# Patient Record
Sex: MALE | Race: WHITE | URBAN - METROPOLITAN AREA
[De-identification: names, ages, dates, MRNs, and addresses within clinical notes are randomized per-mention and may not be internally consistent; named-entity substitution may affect disease eponyms.]

---

## 2020-12-02 ENCOUNTER — TELEPHONE ENCOUNTER (OUTPATIENT)
Dept: URBAN - METROPOLITAN AREA CLINIC 92 | Facility: CLINIC | Age: 50
End: 2020-12-02

## 2020-12-02 RX ORDER — MESALAMINE 1.2 G/1
2 TABS ONCE A DAY TABLET, DELAYED RELEASE ORAL
Qty: 180 | Refills: 3
Start: 2019-10-16

## 2020-12-02 RX ORDER — MESALAMINE 1.2 G/1
2 TABLETS TABLET, DELAYED RELEASE ORAL ONCE A DAY
Qty: 60 TABLET | Refills: 1 | OUTPATIENT
Start: 2020-12-02 | End: 2021-01-31

## 2020-12-02 RX ORDER — MESALAMINE 1.2 G/1
2 TABS ONCE A DAY TABLET, DELAYED RELEASE ORAL
Qty: 180 | Refills: 3 | Status: ACTIVE | COMMUNITY
Start: 2019-10-16

## 2020-12-02 NOTE — HPI-TODAY'S VISIT:
Patient request refill.  Overdue for follow up visit and also due for colonoscopy for surveillance of advanced adenoma with villous component.  Was due for colonoscopy in JAN 2020.  Patient has follow up appt scheduled.

## 2020-12-28 ENCOUNTER — WEB ENCOUNTER (OUTPATIENT)
Dept: URBAN - METROPOLITAN AREA CLINIC 128 | Facility: CLINIC | Age: 50
End: 2020-12-28

## 2020-12-28 ENCOUNTER — OFFICE VISIT (OUTPATIENT)
Dept: URBAN - METROPOLITAN AREA CLINIC 128 | Facility: CLINIC | Age: 50
End: 2020-12-28
Payer: COMMERCIAL

## 2020-12-28 DIAGNOSIS — K63.5 COLON POLYPS: ICD-10-CM

## 2020-12-28 DIAGNOSIS — K51.90 ULCERATIVE COLITIS: ICD-10-CM

## 2020-12-28 PROCEDURE — G8482 FLU IMMUNIZE ORDER/ADMIN: HCPCS | Performed by: INTERNAL MEDICINE

## 2020-12-28 PROCEDURE — G8420 CALC BMI NORM PARAMETERS: HCPCS | Performed by: INTERNAL MEDICINE

## 2020-12-28 PROCEDURE — G8427 DOCREV CUR MEDS BY ELIG CLIN: HCPCS | Performed by: INTERNAL MEDICINE

## 2020-12-28 PROCEDURE — G9903 PT SCRN TBCO ID AS NON USER: HCPCS | Performed by: INTERNAL MEDICINE

## 2020-12-28 PROCEDURE — 99214 OFFICE O/P EST MOD 30 MIN: CPT | Performed by: INTERNAL MEDICINE

## 2020-12-28 RX ORDER — MESALAMINE 1.2 G/1
2 TABLETS TABLET, DELAYED RELEASE ORAL ONCE A DAY
Qty: 60 TABLET | Refills: 1 | Status: ACTIVE | COMMUNITY
Start: 2020-12-02 | End: 2021-01-31

## 2020-12-28 RX ORDER — MESALAMINE 1.2 G/1
2 TABS ONCE A DAY TABLET, DELAYED RELEASE ORAL
Qty: 180 | Refills: 3 | Status: ACTIVE | COMMUNITY
Start: 2019-10-16

## 2020-12-28 RX ORDER — MESALAMINE 1.2 G/1
2 TABS ONCE A DAY TABLET, DELAYED RELEASE ORAL ONCE A DAY
Qty: 180 TABLET | Refills: 3
Start: 2019-10-16 | End: 2020-10-10

## 2021-01-05 ENCOUNTER — OFFICE VISIT (OUTPATIENT)
Dept: URBAN - METROPOLITAN AREA SURGERY CENTER 31 | Facility: SURGERY CENTER | Age: 51
End: 2021-01-05

## 2021-01-13 ENCOUNTER — OFFICE VISIT (OUTPATIENT)
Dept: URBAN - METROPOLITAN AREA SURGERY CENTER 31 | Facility: SURGERY CENTER | Age: 51
End: 2021-01-13
Payer: COMMERCIAL

## 2021-01-13 DIAGNOSIS — K51.50 CHRONIC LEFT-SIDED ULCERATIVE COLITIS: ICD-10-CM

## 2021-01-13 DIAGNOSIS — K63.89 BACTERIAL OVERGROWTH SYNDROME: ICD-10-CM

## 2021-01-13 PROCEDURE — G8907 PT DOC NO EVENTS ON DISCHARG: HCPCS | Performed by: INTERNAL MEDICINE

## 2021-01-13 PROCEDURE — G9937 DIG OR SURV COLSCO: HCPCS | Performed by: INTERNAL MEDICINE

## 2021-01-13 PROCEDURE — 45380 COLONOSCOPY AND BIOPSY: CPT | Performed by: INTERNAL MEDICINE

## 2021-02-03 ENCOUNTER — OFFICE VISIT (OUTPATIENT)
Dept: URBAN - METROPOLITAN AREA SURGERY CENTER 31 | Facility: SURGERY CENTER | Age: 51
End: 2021-02-03

## 2022-01-18 ENCOUNTER — WEB ENCOUNTER (OUTPATIENT)
Dept: URBAN - METROPOLITAN AREA CLINIC 128 | Facility: CLINIC | Age: 52
End: 2022-01-18

## 2022-01-18 ENCOUNTER — OFFICE VISIT (OUTPATIENT)
Dept: URBAN - METROPOLITAN AREA TELEHEALTH 2 | Facility: TELEHEALTH | Age: 52
End: 2022-01-18
Payer: COMMERCIAL

## 2022-01-18 ENCOUNTER — TELEPHONE ENCOUNTER (OUTPATIENT)
Dept: URBAN - METROPOLITAN AREA CLINIC 92 | Facility: CLINIC | Age: 52
End: 2022-01-18

## 2022-01-18 DIAGNOSIS — K63.5 COLON POLYPS: ICD-10-CM

## 2022-01-18 DIAGNOSIS — K51.80 CHRONIC PANCOLONIC ULCERATIVE COLITIS: ICD-10-CM

## 2022-01-18 PROCEDURE — 99213 OFFICE O/P EST LOW 20 MIN: CPT | Performed by: INTERNAL MEDICINE

## 2022-01-18 RX ORDER — MESALAMINE 1.2 G/1
2 TABLETS WITH A MEAL TABLET, DELAYED RELEASE ORAL ONCE A DAY
Qty: 180 TABLET | Refills: 3 | OUTPATIENT
Start: 2022-01-18 | End: 2023-01-13

## 2022-01-18 RX ORDER — MESALAMINE 1.2 G/1
2 TABLETS WITH A MEAL TABLET, DELAYED RELEASE ORAL ONCE A DAY
Status: ACTIVE | COMMUNITY

## 2022-01-18 NOTE — HPI-TODAY'S VISIT:
The patient is here for an annual ulcerative colitis follow up visit. He is doing well on mesalamine 2 tabs po every morning and offers no GI symptoms currently. He denies diarrhea, rectal bleeding or mucous, abdominal pain. His last colonoscopy was normal in 01/21 and he was told to repeat it for surveillance in 1-2 years. He offers no upper GI symptoms.

## 2022-02-02 LAB
A/G RATIO: 1.9
ALBUMIN: 4.6
ALKALINE PHOSPHATASE: 71
ALT (SGPT): 17
AST (SGOT): 22
BASO (ABSOLUTE): 0
BASOS: 1
BILIRUBIN, TOTAL: 0.4
BUN/CREATININE RATIO: 15
BUN: 15
C-REACTIVE PROTEIN, QUANT: <1
CALCIUM: 9.5
CARBON DIOXIDE, TOTAL: 24
CHLORIDE: 104
CREATININE: 1.03
EGFR IF AFRICN AM: 97
EGFR IF NONAFRICN AM: 84
EOS (ABSOLUTE): 0
EOS: 1
GLOBULIN, TOTAL: 2.4
GLUCOSE: 91
HEMATOCRIT: 45.8
HEMATOLOGY COMMENTS:: (no result)
HEMOGLOBIN: 15.8
IMMATURE CELLS: (no result)
IMMATURE GRANS (ABS): 0
IMMATURE GRANULOCYTES: 0
LYMPHS (ABSOLUTE): 1.5
LYMPHS: 34
MCH: 31.7
MCHC: 34.5
MCV: 92
MONOCYTES(ABSOLUTE): 0.5
MONOCYTES: 10
NEUTROPHILS (ABSOLUTE): 2.4
NEUTROPHILS: 54
NRBC: (no result)
PLATELETS: 194
POTASSIUM: 4.2
PROTEIN, TOTAL: 7
RBC: 4.98
RDW: 12.4
SEDIMENTATION RATE-WESTERGREN: 2
SODIUM: 141
WBC: 4.5

## 2023-01-19 ENCOUNTER — ERX REFILL RESPONSE (OUTPATIENT)
Dept: URBAN - METROPOLITAN AREA CLINIC 128 | Facility: CLINIC | Age: 53
End: 2023-01-19

## 2023-01-19 RX ORDER — MESALAMINE 1.2 G/1
TAKE TWO TABLETS BY MOUTH ONE TIME DAILY WITH A MEAL TABLET, DELAYED RELEASE ORAL
Qty: 180 TABLET | Refills: 3 | OUTPATIENT

## 2023-01-19 RX ORDER — MESALAMINE 1.2 G/1
TAKE TWO TABLETS BY MOUTH ONE TIME DAILY WITH A MEAL TABLET, DELAYED RELEASE ORAL
Qty: 180 TABLET | Refills: 4 | OUTPATIENT

## 2023-08-22 ENCOUNTER — DASHBOARD ENCOUNTERS (OUTPATIENT)
Age: 53
End: 2023-08-22

## 2023-08-22 ENCOUNTER — OFFICE VISIT (OUTPATIENT)
Dept: URBAN - METROPOLITAN AREA CLINIC 128 | Facility: CLINIC | Age: 53
End: 2023-08-22
Payer: COMMERCIAL

## 2023-08-22 VITALS
TEMPERATURE: 98.1 F | HEIGHT: 69 IN | DIASTOLIC BLOOD PRESSURE: 80 MMHG | BODY MASS INDEX: 26.96 KG/M2 | SYSTOLIC BLOOD PRESSURE: 132 MMHG | WEIGHT: 182 LBS

## 2023-08-22 DIAGNOSIS — K63.5 COLON POLYPS: ICD-10-CM

## 2023-08-22 DIAGNOSIS — K51.90 ULCERATIVE COLITIS: ICD-10-CM

## 2023-08-22 PROBLEM — 64766004 ULCERATIVE COLITIS: Status: ACTIVE | Noted: 2020-12-02

## 2023-08-22 PROCEDURE — 99213 OFFICE O/P EST LOW 20 MIN: CPT | Performed by: INTERNAL MEDICINE

## 2023-08-22 RX ORDER — MESALAMINE 1.2 G/1
TAKE TWO TABLETS BY MOUTH ONE TIME DAILY WITH A MEAL TABLET, DELAYED RELEASE ORAL
Qty: 180 TABLET | Refills: 3 | Status: ACTIVE | COMMUNITY

## 2023-08-22 RX ORDER — MESALAMINE 1.2 G/1
TAKE TWO TABLETS BY MOUTH ONE TIME DAILY WITH A MEAL TABLET, DELAYED RELEASE ORAL
Qty: 180 TABLET | Refills: 3

## 2023-08-22 NOTE — HPI-TODAY'S VISIT:
The patient is here for an annual ulcerative colitis follow up visit. He is doing well on mesalamine 2 tabs po every morning and offers no GI symptoms currently. He denies diarrhea, rectal bleeding or mucous, abdominal pain. His last colonoscopy was normal in 01/21 and he was told to repeat it for surveillance in 1-2 years. He offers no upper GI symptoms.  No rash, eye pain or redness, pruritis.  Labwork was normal last year.  No labwork since last visit.

## 2023-08-23 LAB
A/G RATIO: 1.6
ALBUMIN: 4.2
ALKALINE PHOSPHATASE: 66
ALT (SGPT): 17
AST (SGOT): 21
BILIRUBIN, TOTAL: 0.6
BUN/CREATININE RATIO: (no result)
BUN: 13
C-REACTIVE PROTEIN, QUANT: 0.9
CALCIUM: 9.5
CARBON DIOXIDE, TOTAL: 28
CHLORIDE: 106
CREATININE: 1.13
EGFR: 79
GLOBULIN, TOTAL: 2.6
GLUCOSE: 93
HEMATOCRIT: 44.5
HEMOGLOBIN: 15.2
MCH: 31.9
MCHC: 34.2
MCV: 93.5
MPV: 10
PLATELET COUNT: 210
POTASSIUM: 4.6
PROTEIN, TOTAL: 6.8
RDW: 12.8
RED BLOOD CELL COUNT: 4.76
SODIUM: 140
WHITE BLOOD CELL COUNT: 4.3

## 2023-11-21 ENCOUNTER — TELEPHONE ENCOUNTER (OUTPATIENT)
Dept: URBAN - METROPOLITAN AREA CLINIC 128 | Facility: CLINIC | Age: 53
End: 2023-11-21

## 2023-12-04 ENCOUNTER — OFFICE VISIT (OUTPATIENT)
Dept: URBAN - METROPOLITAN AREA SURGERY CENTER 31 | Facility: SURGERY CENTER | Age: 53
End: 2023-12-04

## 2024-01-22 ENCOUNTER — OUT OF OFFICE VISIT (OUTPATIENT)
Dept: URBAN - METROPOLITAN AREA SURGERY CENTER 31 | Facility: SURGERY CENTER | Age: 54
End: 2024-01-22
Payer: COMMERCIAL

## 2024-01-22 ENCOUNTER — CLAIMS CREATED FROM THE CLAIM WINDOW (OUTPATIENT)
Dept: URBAN - METROPOLITAN AREA CLINIC 4 | Facility: CLINIC | Age: 54
End: 2024-01-22
Payer: COMMERCIAL

## 2024-01-22 DIAGNOSIS — Z09 ENCNTR FOR F/U EXAM AFT TRTMT FOR COND OTH THAN MALIG NEOPLM: ICD-10-CM

## 2024-01-22 DIAGNOSIS — K63.89 OTHER SPECIFIED DISEASES OF INTESTINE: ICD-10-CM

## 2024-01-22 DIAGNOSIS — K52.3 COLITIS, INDETERMINATE: ICD-10-CM

## 2024-01-22 DIAGNOSIS — K52.9 INFLAMMATORY BOWEL DISEASE: ICD-10-CM

## 2024-01-22 DIAGNOSIS — Z86.010 ADENOMAS PERSONAL HISTORY OF COLONIC POLYPS: ICD-10-CM

## 2024-01-22 PROCEDURE — 45380 COLONOSCOPY AND BIOPSY: CPT | Performed by: INTERNAL MEDICINE

## 2024-01-22 PROCEDURE — 88313 SPECIAL STAINS GROUP 2: CPT | Performed by: PATHOLOGY

## 2024-01-22 PROCEDURE — G8907 PT DOC NO EVENTS ON DISCHARG: HCPCS | Performed by: INTERNAL MEDICINE

## 2024-01-22 PROCEDURE — 00811 ANES LWR INTST NDSC NOS: CPT | Performed by: NURSE ANESTHETIST, CERTIFIED REGISTERED

## 2024-01-22 PROCEDURE — 88342 IMHCHEM/IMCYTCHM 1ST ANTB: CPT | Performed by: PATHOLOGY

## 2024-01-22 PROCEDURE — 88305 TISSUE EXAM BY PATHOLOGIST: CPT | Performed by: PATHOLOGY

## 2025-03-13 ENCOUNTER — ERX REFILL RESPONSE (OUTPATIENT)
Dept: URBAN - METROPOLITAN AREA CLINIC 128 | Facility: CLINIC | Age: 55
End: 2025-03-13

## 2025-03-13 RX ORDER — MESALAMINE 1.2 G/1
TAKE TWO TABLETS BY MOUTH ONE TIME DAILY WITH A MEAL TABLET, DELAYED RELEASE ORAL
Qty: 180 TABLET | Refills: 3 | OUTPATIENT

## 2025-03-14 ENCOUNTER — TELEPHONE ENCOUNTER (OUTPATIENT)
Dept: URBAN - METROPOLITAN AREA CLINIC 128 | Facility: CLINIC | Age: 55
End: 2025-03-14

## 2025-04-22 ENCOUNTER — TELEPHONE ENCOUNTER (OUTPATIENT)
Dept: URBAN - METROPOLITAN AREA CLINIC 19 | Facility: CLINIC | Age: 55
End: 2025-04-22

## 2025-04-22 ENCOUNTER — OFFICE VISIT (OUTPATIENT)
Dept: URBAN - METROPOLITAN AREA CLINIC 128 | Facility: CLINIC | Age: 55
End: 2025-04-22
Payer: COMMERCIAL

## 2025-04-22 DIAGNOSIS — Z09 ENCOUNTER FOR COLONOSCOPY FOLLOWING COLON POLYP REMOVAL: ICD-10-CM

## 2025-04-22 DIAGNOSIS — Z86.0100 PERSONAL HISTORY OF COLONIC POLYPS: ICD-10-CM

## 2025-04-22 DIAGNOSIS — K51.90 ULCERATIVE COLITIS: ICD-10-CM

## 2025-04-22 PROCEDURE — 99213 OFFICE O/P EST LOW 20 MIN: CPT | Performed by: INTERNAL MEDICINE

## 2025-04-22 RX ORDER — MESALAMINE 1.2 G/1
TAKE TWO TABLETS BY MOUTH ONE TIME DAILY WITH A MEAL TABLET, DELAYED RELEASE ORAL
Qty: 180 TABLET | Refills: 3 | Status: ACTIVE | COMMUNITY

## 2025-04-22 RX ORDER — MESALAMINE 1.2 G/1
TAKE TWO TABLETS BY MOUTH ONE TIME DAILY WITH A MEAL TABLET, DELAYED RELEASE ORAL
Qty: 180 TABLET | Refills: 3

## 2025-04-22 NOTE — PHYSICAL EXAM NEUROLOGIC:
oriented to person, place and time. Grossly normal motor function and sensation, facial cranial nerves grossly normal details… detailed exam

## 2025-04-22 NOTE — HPI-TODAY'S VISIT:
The patient is here for an annual ulcerative colitis follow up visit. He is doing well on mesalamine 2 tabs po every morning and offers no GI symptoms currently. He denies diarrhea, rectal bleeding or mucous, abdominal pain. His last colonoscopy was normal in 01/24 and he was to repeat surveillance in 2 years. He offers no upper GI symptoms.  No rash, eye pain or redness, pruritis.  Labwork was normal last year.  No labwork since last visit.  There is hx of an incidental small adenoma as well felt unrelated to his UC.  No arthralgia, arthritis, eye pain or redness, no pruritis or rash.

## 2025-04-23 LAB
A/G RATIO: 1.7
ABSOLUTE BASOPHILS: 39
ABSOLUTE EOSINOPHILS: 20
ABSOLUTE LYMPHOCYTES: 1092
ABSOLUTE MONOCYTES: 378
ABSOLUTE NEUTROPHILS: 2371
ALBUMIN: 4.5
ALKALINE PHOSPHATASE: 67
ALT (SGPT): 17
AST (SGOT): 22
BASOPHILS: 1
BILIRUBIN, TOTAL: 0.6
BUN/CREATININE RATIO: (no result)
BUN: 10
C-REACTIVE PROTEIN, QUANT: <3
CALCIUM: 9.5
CARBON DIOXIDE, TOTAL: 29
CHLORIDE: 104
CREATININE: 0.87
EGFR: 103
EOSINOPHILS: 0.5
GLOBULIN, TOTAL: 2.6
GLUCOSE: 103
HEMATOCRIT: 47.7
HEMOGLOBIN: 15.5
LYMPHOCYTES: 28
MCH: 31.4
MCHC: 32.5
MCV: 96.6
MONOCYTES: 9.7
MPV: 10.2
NEUTROPHILS: 60.8
PLATELET COUNT: 213
POTASSIUM: 4.4
PROTEIN, TOTAL: 7.1
RDW: 12.6
RED BLOOD CELL COUNT: 4.94
SODIUM: 140
WHITE BLOOD CELL COUNT: 3.9